# Patient Record
(demographics unavailable — no encounter records)

---

## 2024-10-17 NOTE — PHYSICAL EXAM
[2+] : left 2+ [Ankle Swelling (On Exam)] : present [] : present [Alert] : alert [Oriented to Person] : oriented to person [Oriented to Place] : oriented to place [Oriented to Time] : oriented to time [de-identified] : alert and oriented in NAD [de-identified] : b/l dermatitis  [de-identified] : CN II-XII grossly intact

## 2024-10-17 NOTE — HISTORY OF PRESENT ILLNESS
[FreeTextEntry1] : 88yo M with pmHx LBBB s/p PPM, HLD, prostate ca s/p radiation therapy, vertigo who presents for consult after hospitalization for near fainting episode.  Pt reports long standing hx of dizziness for which he takes meclizine daily for.  Recently he reports worsening dizzy spells to the point he almost fainted.  PEARSON in the hospital showed moderate irregular plaque and R ICA stenosis.  Pt denies amaurosis, syncope or stroke symptoms.  He also c/o rash to both lower  extremities with superficial excoriations 2/2 to scratching.

## 2024-10-17 NOTE — PLAN
[facility] - Short Stay Cardiac Unit  Discharge Note  Short Stay    Procedure(s) (LRB):  CATHETERIZATION, HEART, COMBINED RIGHT AND RETROGRADE LEFT, FOR CONGENITAL HEART DEFECT (N/A)  ECHOCARDIOGRAM, TRANSTHORACIC    OUTCOME: Patient tolerated treatment/procedure well without complication and is now ready for discharge.    DISPOSITION: Home or Self Care    FINAL DIAGNOSIS:  Repaired coarctation, MVR    FOLLOWUP: In clinic    DISCHARGE INSTRUCTIONS:  Standard precautions, resume prior medications    TIME SPENT ON DISCHARGE: 20 minutes  
[TextEntry] : 88yo M with c/o dizziness, CTA head and neck in hospital shows stenosis right ICA with moderate plaque imaging today shows normal velocities, resistive flow right vertebral - will refer patent to Dr. Butler for further management

## 2024-11-14 NOTE — HISTORY OF PRESENT ILLNESS
[FreeTextEntry1] : 87-year-old male with complex past medical history as listed bilateral carotid artery stenosis.  Patient was found to have right carotid artery high-grade stenosis and CT angiogram for dizzy spells in outside hospital.  Denies any amaurosis fugax TIA symptoms.  Past medical history significant for aortic valvular disease.  Patient also has bilateral lower extremity varicosities symptomatic.

## 2024-11-14 NOTE — REVIEW OF SYSTEMS
[Heart Rate Is Slow] : the heart rate was not slow [Heart Rate Is Fast] : the heart rate was not fast [Chest Pain] : no chest pain [Palpitations] : no palpitations [Leg Claudication] : no intermittent leg claudication [Lower Ext Edema] : lower extremity edema [Limb Pain] : limb pain [Limb Swelling] : limb swelling [Negative] : Psychiatric

## 2024-11-14 NOTE — ASSESSMENT
[FreeTextEntry1] : Carotid artery stenosis CT angiogram was reviewed.  Right carotid artery high-grade stenosis was noted. Patient to continue medical management with 81 mg of aspirin daily Patient to follow-up in 3 months for reevaluation Given patient asymptomatic status and patient age we will continue medical management at this point  Bilateral lower extremity symptomatic varicosities Wound care Phlebectomy Referral to Dr. Galicia

## 2024-11-14 NOTE — PHYSICAL EXAM
[de-identified] : Head and neck exam within normal limit [de-identified] : Breathing comfortably on room air [de-identified] : Regular rate and rhythm [FreeTextEntry1] : Palpable bilateral radial and femoral pulses.  Bilateral lower extremity varicosities.  Evidence of healed left lateral venous wound.

## 2024-12-03 NOTE — PLAN
[TextEntry] : 86yo male with b/l LE CVI, he was found to have saphenous reflux.  He is asymptomatic and presented today with concerns of area left ankle.  This appears to be hyperkeratotic skin with excoriated overlying skin 2/2 scratching.  - He will follow-up with dermatology asap.   - will manage his CVI conservatively with compression stockings.

## 2024-12-03 NOTE — PHYSICAL EXAM
[Respiratory Effort] : normal respiratory effort [2+] : left 2+ [Ankle Swelling (On Exam)] : present [Ankle Swelling Bilaterally] : bilaterally  [Varicose Veins Of Lower Extremities] : bilaterally [] : bilaterally [de-identified] : alert and oriented in NAD [FreeTextEntry1] :   Hyperkeratotic skin left lateral ankle, overlying skin excoriated likely 2/2 scratching.

## 2024-12-03 NOTE — HISTORY OF PRESENT ILLNESS
Nurse to nurse report given to [facility] [FreeTextEntry1] : 86yo M with pmHx LBBB s/p PPM, HLD, prostate ca s/p radiation therapy, vertigo who presents for follow-up for bilateral CVI.  He is concerned with a new "lump" left ankle x 1 month.   Patient was referred to Dr. Marinelli for evaluation of carotid artery stenosis, after imaging reviewed, he will monitor with yearly ultrasounds and he did not believe this was contributing to his dizziness. after hospitalization for near fainting episode. He denies lower extremity pain or heaviness.  He does have pruritus to both legs that is intermittent.

## 2025-01-21 NOTE — ASSESSMENT
[FreeTextEntry1] : Hemodynamically stable with acceptable BP Lab profiles drawn in office and sent B12 1ml IM given L deltoid

## 2025-01-21 NOTE — HISTORY OF PRESENT ILLNESS
[de-identified] : Presents with daughter for BP check, labs, and general follow-up; also due for B12 injection; also needs forms completed and TB screening for (?) temporary residence in Assisted Living.  States feeling generally well.

## 2025-02-05 NOTE — PROCEDURE
[Pacemaker] : pacemaker [DDD] : DDD [Voltage: ___ volts] : Voltage was [unfilled] volts [Longevity: ___ months] : The estimated remaining battery life is [unfilled] months [Threshold Testing Performed] : Threshold testing was performed [Atrial] : Atrial [Ventricular] : Ventricular [Lead Imp:  ___ohms] : lead impedance was [unfilled] ohms [Sensing Amplitude ___mv] : sensing amplitude was [unfilled] mv [___V @] : [unfilled] V [___ ms] : [unfilled] ms [None] : none [Asense-Vsense ___ %] : Asense-Vsense [unfilled]% [Asense-Vpace ___ %] : Asense-Vpace [unfilled]% [Apace-Vsense ___ %] : Apace-Vsense [unfilled]% [Apace-Vpace ___ %] : Apace-Vpace [unfilled]% [de-identified] : MEDTRONIC [de-identified] : [id number] [de-identified] : [id number] [de-identified] : 4/28/2017 [de-identified] :  [de-identified] : no episodes normal device function

## 2025-02-27 NOTE — PHYSICAL EXAM
[de-identified] : Head and neck exam within normal limit [de-identified] : Breathing comfortably on room air [de-identified] : Regular rate and rhythm [FreeTextEntry1] : Palpable bilateral radial and femoral pulses.  Bilateral lower extremity varicosities.  Evidence of healed left lateral venous wound.

## 2025-02-27 NOTE — HISTORY OF PRESENT ILLNESS
[FreeTextEntry1] : 87-year-old male with complex past medical history as listed bilateral carotid artery stenosis.  Patient was found to have right carotid artery high-grade stenosis and CT angiogram for dizzy spells in outside hospital.  Denies any amaurosis fugax TIA symptoms.  Past medical history significant for aortic valvular disease.  Patient also has bilateral lower extremity varicosities symptomatic.  2/27/2025: Patient presented for follow-up.  Carotid artery stenosis no symptoms.  He underwent left lower extremity skin lesion resection recently which was positive for squamous cell cancer.

## 2025-02-27 NOTE — DATA REVIEWED
[FreeTextEntry1] : 2/27/2025 arterial carotid duplex examination showed less than 50% stenosis bilateral internal carotid artery bilaterally

## 2025-02-27 NOTE — ASSESSMENT
[FreeTextEntry1] : Carotid artery stenosis CT angiogram was reviewed.  Right carotid artery high-grade stenosis was noted however carotid artery duplex revealed less than 50% stenosis Patient to continue medical management with 81 mg of aspirin daily Patient to follow-up in 12 months for reevaluation Given patient asymptomatic status and patient age we will continue medical management at this point

## 2025-02-28 NOTE — ASSESSMENT
[FreeTextEntry1] : Hemodynamically stable with acceptable BP St 2 LLE - re-dressed; will attempt to have Wound Care see patient Try low dose Lexapro and observe

## 2025-02-28 NOTE — HISTORY OF PRESENT ILLNESS
[de-identified] : Presents with daughter for general follow-up.  Also needs PPD for facility; also daughter is concerned about a wound on his L lower leg.  Also daughter states patient has displayed marked increase in anxiety and is requesting "something."

## 2025-02-28 NOTE — PHYSICAL EXAM
[No Acute Distress] : no acute distress [Normal] : normal rate, regular rhythm, normal S1 and S2 and no murmur heard [Soft] : abdomen soft [Non Tender] : non-tender [Normal Posterior Cervical Nodes] : no posterior cervical lymphadenopathy [Normal Anterior Cervical Nodes] : no anterior cervical lymphadenopathy [No Focal Deficits] : no focal deficits [Alert and Oriented x3] : oriented to person, place, and time [de-identified] : chronic LE changes [de-identified] : Stage 2 lateral aspect L lower leg - approx [id number]; no drainage; no erythema

## 2025-03-04 NOTE — PHYSICAL EXAM
[2+] : left 2+ [Ankle Swelling (On Exam)] : present [Ankle Swelling Bilaterally] : bilaterally  [Varicose Veins Of Lower Extremities] : bilaterally [] : bilaterally [Skin Ulcer] : ulcer [de-identified] : alert and oriented in NAD [de-identified] : L emmanuelf s/p excision of SCC

## 2025-03-04 NOTE — PLAN
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  He is awaiting wound care to be set up at [facility].  Continue with compression stockings daily.  Home health services for wound care daily.  Would recommend treatment of his underlying venous insufficiency.  Will plan for VenaSeal of the GSV bilaterally.  Continue follow-up with Dr. Marinelli today regarding carotid artery stenosis.

## 2025-03-04 NOTE — HISTORY OF PRESENT ILLNESS
[FreeTextEntry1] : This is an 87-year-old male past medical history of carotid artery stenosis right carotid high-grade stenosis is noted on CTA later duplex reported that is less than 50% he was evaluated by Dr. Barron today who recommended medical management with aspirin daily and to return to clinic in 12 months.  He recently had a resection of the left lower leg lesion which turned out to be squamous cell carcinoma he now has a large open ulceration on his lateral calf.  Originally when he was evaluated, he underwent a venous study of both legs which showed bilateral saphenous reflux.  He is now wearing knee-high compression stockings daily.

## 2025-03-06 NOTE — HISTORY OF PRESENT ILLNESS
[de-identified] : Pt is an 86 y/o male with right patella fracture.  He tripped and fell 2 days ago and he landed on his right knee.  He had pain immediately.  He went to Urgent Care where xrays revealed a right patella fracture.  He was advised to follow up with a specialist.  He is ambulalting with a walker.

## 2025-03-06 NOTE — PHYSICAL EXAM
[de-identified] : Patient is WDWN, alert, and in no acute distress. Breathing is unlabored. He is grossly oriented to person, place, and time.  He is accompanied here with his daughter.  Right knee: There is no patellar tenderness to palpation. Positive swelling and abrasion Range of motion: Limited with pain. 0-90 Tests and Signs: All tests for stability are normal. Strength: Limited Abrasion on anterior aspect of knee.  [de-identified] : Right knee xrays 4v reveal a right nondisplaced patella fracture  Procedure was performed at the [facility] EXAM: KNEE RIGHT PROCEDURE DATE: 03/04/2025 INTERPRETATION: CLINICAL INDICATION: Knee pain after fall. Question fracture. TECHNIQUE: PA, oblique, and lateral views of the right knee. COMPARISON: None available. FINDINGS: Linear lucency suspicious for vertically oriented fracture of the lateral one third of the patella. No additional suspected fracture. No dislocation. Cartilage space narrowing and marginal osteophyte formation in the medial tibiofemoral patellofemoral compartments. Small knee joint effusion. Vascular calcifications. IMPRESSION: Linear lucency suspicious for nondisplaced fracture of the patella. Correlate with site of point tenderness. Consider sunrise views of the knee or cross-sectional imaging for further evaluation. --- End of Report ---             MARIAN STACY MD; Attending Radiologist This document has been electronically signed. Mar 4 2025 11:48AM

## 2025-03-06 NOTE — DISCUSSION/SUMMARY
[de-identified] : The underlying pathophysiology was reviewed with the patient. XR films were reviewed with the patient. Discussed at length the nature of the patient's condition. The right knee symptoms appear secondary to a nondisplaced fracture of the patella.. The patient and I discussed his options regarding treatment.  Patient was advised to take OTC medications and topical analgesic for pain management. Activity modification was discussed in great detail. Weight bear as tolerated with his walker. I encouraged the patient to actively walk. The patient can elevate his right knee above the level of the heart for swelling control. Gentle range of motion, stretching, and strengthening exercises were encouraged. Patient should actively work on gradually increasing use of the leg, as tolerated.  All questions answered, understanding verbalized. Patient in agreement with plan of care. The patient can follow-up in 1 month. If the patient begins to experience any changes or severe exacerbation of his symptoms, he should reach out to me as soon as possible.

## 2025-03-06 NOTE — HISTORY OF PRESENT ILLNESS
[de-identified] : Pt is an 86 y/o male with right patella fracture.  He tripped and fell 2 days ago and he landed on his right knee.  He had pain immediately.  He went to Urgent Care where xrays revealed a right patella fracture.  He was advised to follow up with a specialist.  He is ambulalting with a walker.

## 2025-03-06 NOTE — PHYSICAL EXAM
[de-identified] : Patient is WDWN, alert, and in no acute distress. Breathing is unlabored. He is grossly oriented to person, place, and time.  He is accompanied here with his daughter.  Right knee: There is no patellar tenderness to palpation. Positive swelling and abrasion Range of motion: Limited with pain. 0-90 Tests and Signs: All tests for stability are normal. Strength: Limited Abrasion on anterior aspect of knee.  [de-identified] : Right knee xrays 4v reveal a right nondisplaced patella fracture  Procedure was performed at the [facility] EXAM: KNEE RIGHT PROCEDURE DATE: 03/04/2025 INTERPRETATION: CLINICAL INDICATION: Knee pain after fall. Question fracture. TECHNIQUE: PA, oblique, and lateral views of the right knee. COMPARISON: None available. FINDINGS: Linear lucency suspicious for vertically oriented fracture of the lateral one third of the patella. No additional suspected fracture. No dislocation. Cartilage space narrowing and marginal osteophyte formation in the medial tibiofemoral patellofemoral compartments. Small knee joint effusion. Vascular calcifications. IMPRESSION: Linear lucency suspicious for nondisplaced fracture of the patella. Correlate with site of point tenderness. Consider sunrise views of the knee or cross-sectional imaging for further evaluation. --- End of Report ---             MARIAN STACY MD; Attending Radiologist This document has been electronically signed. Mar 4 2025 11:48AM

## 2025-03-06 NOTE — DISCUSSION/SUMMARY
[de-identified] : The underlying pathophysiology was reviewed with the patient. XR films were reviewed with the patient. Discussed at length the nature of the patient's condition. The right knee symptoms appear secondary to a nondisplaced fracture of the patella.. The patient and I discussed his options regarding treatment.  Patient was advised to take OTC medications and topical analgesic for pain management. Activity modification was discussed in great detail. Weight bear as tolerated with his walker. I encouraged the patient to actively walk. The patient can elevate his right knee above the level of the heart for swelling control. Gentle range of motion, stretching, and strengthening exercises were encouraged. Patient should actively work on gradually increasing use of the leg, as tolerated.  All questions answered, understanding verbalized. Patient in agreement with plan of care. The patient can follow-up in 1 month. If the patient begins to experience any changes or severe exacerbation of his symptoms, he should reach out to me as soon as possible.

## 2025-03-06 NOTE — ADDENDUM
[FreeTextEntry1] : I, Fabian Vasquez wrote this note acting as a scribe for Dr. Augustine Cruz on 03/06/2025.   All medical record entries made by the Scribe were at my, Dr. Augustine Cruz M.D., direction and personally dictated by me on 03/06/2025. I have personally reviewed the chart and agree that the record accurately reflects my personal performance of the history, physical exam, assessment and plan

## 2025-03-07 NOTE — DISCUSSION/SUMMARY
[Patient] : the patient [Risks] : risks [Benefits] : benefits [Alternatives] : alternatives [___ Month(s)] : in [unfilled] month(s) [FreeTextEntry1] : Requested stat venous duplex.  Discussed with patient and daughter.  Continue crestor, ramipril, aspirin  beta-blocker.  Periodic pacemaker interrogation.  Follow-up 6 months  Continue vascular follow-up and medical follow-up as well   [EKG obtained to assist in diagnosis and management of assessed problem(s)] : EKG obtained to assist in diagnosis and management of assessed problem(s)

## 2025-03-07 NOTE — ASSESSMENT
[FreeTextEntry1] : 87 year old man, seen prior to eye surgery.  s/p hospital stay for vertigo with incidental finding of carotid plaque, being followed by vascular surgery.  Venous insufficiency rule out acute DVT  BP controlled  ASHD.  Status post pacer with an estimated 2-1/2 years of

## 2025-03-07 NOTE — PHYSICAL EXAM
[Well Nourished] : well nourished [No Acute Distress] : no acute distress [Soft] : abdomen soft [Venous varicosities] : venous varicosities [Normal] : alert and oriented, normal memory [de-identified] : Tender left calf with 1+ pitting edema

## 2025-03-07 NOTE — HISTORY OF PRESENT ILLNESS
[FreeTextEntry1] : 87 year old man seen for follow-up visit accompanied by daughter.  [facility] into [facility] with his wife  Reviewed last interrogation, normal function Medtronic Pacer, 2-1/2 years of battery  And squamous cell cancer removed from leg.  Seeing Dr. Kayla Farrell vascular for venous insufficiency plan for further procedure.  Has had increased swelling unilateral left leg.  No chest pain dyspnea

## 2025-03-07 NOTE — REASON FOR VISIT
[Arrhythmia/ECG Abnorrmalities] : arrhythmia/ECG abnormalities [Coronary Artery Disease] : coronary artery disease [Other: ____] : [unfilled] [Other: _____] : [unfilled]

## 2025-03-07 NOTE — CARDIOLOGY SUMMARY
[de-identified] : December 1, 2022 pacing with capture March 2, 2023 sinus rhythm with ventricular pacing atrial tracking 8.21.23 sinus v pacing February 26, 2024 sinus ventricular pacing 8/29/24 dual chamber pacing September 27, 2024 pacing with capture [de-identified] : October 2021 normal nuclear stress [de-identified] : October 2021 mild to moderate LV dysfunction septal motion compatible with RV pacing September 2024 LV size increased wall thickness EF 45 to 50% [de-identified] : 2017 pacemaker [de-identified] : 2008 in 2017, 40% stenosis

## 2025-04-02 NOTE — ASSESSMENT
[FreeTextEntry1] : 4-2-25: Pt here in consultation forSANDI wound Accompanied by spouse and daughter On exam: LLE:  dry skin:  wound with slough and hypergran. superficial wounds in periowund with dry skin. s/p excisional debridement and monsel's to hypergran.  No s/s of infection.

## 2025-04-02 NOTE — PHYSICAL EXAM
[Skin Ulcer] : ulcer [Alert] : alert [Oriented to Person] : oriented to person [Oriented to Place] : oriented to place [Oriented to Time] : oriented to time [Calm] : calm [Please See PDF for Tissue Analytics] : Please See PDF for Tissue Analytics. [1+] : left 1+ [Ankle Swelling (On Exam)] : present [Ankle Swelling Bilaterally] : bilaterally  [Ankle Swelling On The Right] : mild [Varicose Veins Of Lower Extremities] : bilaterally [de-identified] : NAD [de-identified] : AT [de-identified] : supple [de-identified] : equal chest  rise [de-identified] : FROM [de-identified] : soft NT

## 2025-04-02 NOTE — PLAN
[FreeTextEntry1] : 4-2-25: Plan: LLE wound wash with soap and water honey/adaptic/aquacel/gauze/kelsey/ACE 3x/week Moisturize intact skin. Do not put between toes.  Nursing orders given f/u with Dr. Sasha ovalles as per PMD f/u in 2-3 week

## 2025-04-02 NOTE — HISTORY OF PRESENT ILLNESS
[FreeTextEntry1] : 4-2-25: Mr. LAILA MANCIA   presents to the office with a wound for since 1-7-25 (Mohs) duration.  The wound is located on  the LLE  The patient has complaints of non healing  The patient has been dressing the wound with wet to dry. The patient denies fevers or chills.  The patient has localized pain to the wound upon dressing changes.  The patient has no other complaints or associated symptoms.   Pt recently admitted to [facility] for LLE wound and cellulitis-- 3-7-25 to 3-11-25.  Mohs procedure on 1-7-25 by [facility] Pt has a home care Pt lives in assisted living Pt with BUE rash.  Saw PMD today and given steroids.    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 He is awaiting wound care to be set up at [facility]. Continue with compression stockings daily. Home health services for wound care daily. Would recommend treatment of his underlying venous insufficiency. Will plan for VenaSeal of the GSV bilaterally. Continue follow-up with Dr. Marinelli today regarding carotid artery stenosis."

## 2025-04-02 NOTE — REASON FOR VISIT
[Consultation] : a consultation visit [Family Member] : family member [Spouse] : spouse [FreeTextEntry1] : LLE wound s/p Mohs.  Recently admitted for cellulitis

## 2025-04-03 NOTE — PHYSICAL EXAM
[Normal] : no jugular venous distention, supple, no lymphadenopathy and the thyroid was normal and there were no nodules present [de-identified] : WOund checked - noted half dollar granulated healing wound noted at  left lateral aspect of distal left extremity.   Generalizes erythematous warm rashes noted on both forearms

## 2025-04-03 NOTE — ASSESSMENT
[FreeTextEntry1] : Wound cleansed with NS, triple abx ointment applied/ telfa dressing applied with kerlex dressing. Is following up with wound management

## 2025-04-03 NOTE — HISTORY OF PRESENT ILLNESS
[FreeTextEntry8] : Patient presents with urticarious acute rash that was noted of bilateral forearms noted yesterday. History of chronic itch and likely lower extremity stasis dermatitis. Of note patient recently had squamous cell carcinoma resected from lateral left lower distal extremity. Currently has surgical wound that is being cared by wound management. Was previously hospitalized with cellulitis.   HAs been living at [facility] assisted living for about one month with wife. was getting similar rash on thighs that went away with wearing pants at sleep. family asked [facility] to use different detergent.   Accompanied by daughter at visit who is concerned about patient anxiety which has been described as moderate and chronic. Was on Lexapro. With recent hospitaliztion was noted to be acutely hyponatremic which medical team suggested could be related to ssri. Family asking about different options inclluding gabapentin, or mirtazipine

## 2025-04-03 NOTE — PHYSICAL EXAM
[Normal] : no jugular venous distention, supple, no lymphadenopathy and the thyroid was normal and there were no nodules present [de-identified] : WOund checked - noted half dollar granulated healing wound noted at  left lateral aspect of distal left extremity.   Generalizes erythematous warm rashes noted on both forearms

## 2025-04-03 NOTE — HISTORY OF PRESENT ILLNESS
[FreeTextEntry8] : Patient presents with urticarious acute rash that was noted of bilateral forearms noted yesterday. History of chronic itch and likely lower extremity stasis dermatitis. Of note patient recently had squamous cell carcinoma resected from lateral left lower distal extremity. Currently has surgical wound that is being cared by wound management. Was previously hospitalized with cellulitis.   HAs been living at [facility] assisted living for about one month with wife. was getting similar rash on thighs that went away with wearing pants at sleep. family asked [facility] to use different detergent.   Accompanied by daughter at visit who is concerned about patient anxiety which has been described as moderate and chronic. Was on Lexapro. With recent hospitaliztion was noted to be acutely hyponatremic which medical team suggested could be related to ssri. Family asking about different options inclluding gabapentin, or mirtazipine

## 2025-04-18 NOTE — PHYSICAL EXAM
[1+] : left 1+ [Ankle Swelling (On Exam)] : present [Ankle Swelling Bilaterally] : bilaterally  [Ankle Swelling On The Right] : mild [Varicose Veins Of Lower Extremities] : bilaterally [Skin Ulcer] : ulcer [Alert] : alert [Oriented to Person] : oriented to person [Oriented to Place] : oriented to place [Oriented to Time] : oriented to time [Calm] : calm [Please See PDF for Tissue Analytics] : Please See PDF for Tissue Analytics. [de-identified] : NAD [de-identified] : AT [de-identified] : supple [de-identified] : equal chest  rise [de-identified] : soft NT [de-identified] : FROM

## 2025-04-18 NOTE — ASSESSMENT
[FreeTextEntry1] : 4-2-25: Pt here in consultation forLLE wound Accompanied by spouse and daughter On exam: LLE:  dry skin:  wound with slough and hypergran. superficial wounds in periowund with dry skin. s/p excisional debridement and monsel's to hypergran.  No s/s of infection.   4-18-25: Pt here for f/u Accompanied by daughter No new complaints Still has to make appt with Dr. Baez Nurse comes 3x/week Pt scratching leg On exam: LLE wound: smaller, slough s/p excisional debridement,  petechiae in periwound withdry flaking skin No s/s of infection.

## 2025-04-18 NOTE — REASON FOR VISIT
[Follow-Up: _____] : a [unfilled] follow-up visit [Spouse] : spouse [Family Member] : family member [FreeTextEntry1] : LLE wound s/p Mohs.  Recently admitted for cellulitis

## 2025-04-18 NOTE — PLAN
[FreeTextEntry1] : 4-2-25: Plan: LLE wound wash with soap and water honey/adaptic/aquacel/gauze/kelsey/ACE 3x/week Moisturize intact skin. Do not put between toes. Do not scratch Nursing orders given f/u with Dr. Baez rash as per PMD f/u in 2-3 week   4-18-25: Plan: wash with soap and water honey/adaptic/aquacel/gauze/kelsey/ACE 3x/week Moisturize intact skin. Do not put between toes.  Nursing orders given f/u with Dr. Baez rash as per PMD f/u in 2-3 week   4-18-25: Plan: LLE wash with soap and water honey/adaptic/aquacel/gauze/kelsey/ACE 3x/week Moisturize intact skin. Do not put between toes. (Cerave or Eucerin) Nursing orders given f/u with Dr. Baez rash as per PMD f/u in 2-3 week

## 2025-04-18 NOTE — HISTORY OF PRESENT ILLNESS
[FreeTextEntry1] : 4-2-25: Mr. LAILA MANCIA   presents to the office with a wound for since 1-7-25 (Mohs) duration.  The wound is located on  the LLE  The patient has complaints of non healing  The patient has been dressing the wound with wet to dry. The patient denies fevers or chills.  The patient has localized pain to the wound upon dressing changes.  The patient has no other complaints or associated symptoms.   Pt recently admitted to [place] for LLE wound and cellulitis-- 3-7-25 to 3-11-25.  Mohs procedure on 1-7-25 by [facility] Pt has a home care Pt lives in assisted living Pt with BUE rash.  Saw PMD today and given steroids.    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 He is awaiting wound care to be set up at [facility]. Continue with compression stockings daily. Home health services for wound care daily. Would recommend treatment of his underlying venous insufficiency. Will plan for VenaSeal of the GSV bilaterally. Continue follow-up with Dr. Marinelli today regarding carotid artery stenosis."

## 2025-05-05 NOTE — PHYSICAL EXAM
[No Acute Distress] : no acute distress [Normal] : normal rate, regular rhythm, normal S1 and S2 and no murmur heard [No Edema] : there was no peripheral edema [Soft] : abdomen soft [Non Tender] : non-tender [Normal Posterior Cervical Nodes] : no posterior cervical lymphadenopathy [Normal Anterior Cervical Nodes] : no anterior cervical lymphadenopathy [No Focal Deficits] : no focal deficits [Alert and Oriented x3] : oriented to person, place, and time [de-identified] : no active bleeding

## 2025-05-05 NOTE — HISTORY OF PRESENT ILLNESS
[de-identified] : Presents with wife for BP check, labs, and general follow-up.  Note was just in the ER for a nosebleed - ENT F/U pending (Dr. Anand).

## 2025-05-09 NOTE — PLAN
[FreeTextEntry1] : 4-2-25: Plan: LLE wound wash with soap and water honey/adaptic/aquacel/gauze/kelsey/ACE 3x/week Moisturize intact skin. Do not put between toes. Do not scratch Nursing orders given f/u with Dr. Baez rash as per PMD f/u in 2-3 week   4-18-25: Plan: wash with soap and water honey/adaptic/aquacel/gauze/kelsey/ACE 3x/week Moisturize intact skin. Do not put between toes.  Nursing orders given f/u with Dr. Baez rash as per PMD f/u in 2-3 week   4-18-25: Plan: LLE wash with soap and water honey/adaptic/aquacel/gauze/kelsey/ACE 3x/week Moisturize intact skin. Do not put between toes. (Cerave or Eucerin) Nursing orders given f/u with Dr. Baez rash as per PMD f/u in 2-3 week   5-9-25: Plan: LLE wash with soap and water honey/adaptic/aquacel/gauze/kelsey/ACE 3x/week Moisturize intact skin. Do not put between toes. (Cerave or Eucerin) Nursing orders given f/u with Dr. Baez f/u in 2-3 week

## 2025-05-09 NOTE — PHYSICAL EXAM
[1+] : left 1+ [Ankle Swelling (On Exam)] : present [Ankle Swelling Bilaterally] : bilaterally  [Ankle Swelling On The Right] : mild [Varicose Veins Of Lower Extremities] : bilaterally [Skin Ulcer] : ulcer [Alert] : alert [Oriented to Person] : oriented to person [Oriented to Place] : oriented to place [Oriented to Time] : oriented to time [Calm] : calm [Please See PDF for Tissue Analytics] : Please See PDF for Tissue Analytics. [de-identified] : NAD [de-identified] : AT [de-identified] : supple [de-identified] : equal chest  rise [de-identified] : soft NT [de-identified] : FROM

## 2025-05-09 NOTE — ASSESSMENT
[FreeTextEntry1] : 4-2-25: Pt here in consultation forLLE wound Accompanied by spouse and daughter On exam: LLE:  dry skin:  wound with slough and hypergran. superficial wounds in periowund with dry skin. s/p excisional debridement and monsel's to hypergran.  No s/s of infection.   4-18-25: Pt here for f/u Accompanied by daughter No new complaints Still has to make appt with Dr. Baez Nurse comes 3x/week Pt scratching leg On exam: LLE wound: smaller, slough s/p excisional debridement,  petechiae in periwound withdry flaking skin No s/s of infection.   5-9-25: Pt here for f/u Accompanied by daughter No new complaints Has appt with Dr. Baez On exam: LLE wound:  smaller.  scant  slough . s/p excisional debridement   s/p mechanical debridement No s/s of infection.  dry scaly skin to legs

## 2025-05-09 NOTE — HISTORY OF PRESENT ILLNESS
[FreeTextEntry1] : 4-2-25: Mr. LAILA MANCIA   presents to the office with a wound for since 1-7-25 (Mohs) duration.  The wound is located on  the LLE  The patient has complaints of non healing  The patient has been dressing the wound with wet to dry. The patient denies fevers or chills.  The patient has localized pain to the wound upon dressing changes.  The patient has no other complaints or associated symptoms.   Pt recently admitted to [place] for LLE wound and cellulitis-- 3-7-25 to 3-11-25.  Mohs procedure on 1-7-25 by [facility] Pt has a home care Pt lives in assisted living Pt with BUE rash.  Saw PMD today and given steroids.    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 He is awaiting wound care to be set up at [facility]. Continue with compression stockings daily. Home health services for wound care daily. Would recommend treatment of his underlying venous insufficiency. Will plan for VenaSeal of the GSV bilaterally. Continue follow-up with Dr. Marinelli today regarding carotid artery stenosis."

## 2025-05-28 NOTE — PROCEDURE
[FreeTextEntry1] : L GSV Venaseal [FreeTextEntry2] : CVI with non-healing ulceration [FreeTextEntry3] : Procedure: Duplex ultrasound was used to map out the left insufficient great saphenous vein, and access was determined and marked on the overlying skin. The depth and diameter of the vein(s) to be treated was documented. The patient was placed supine on the procedure table and the leg was prepped and draped using sterile technique.  Ultrasound guidance was again used to localize the access site. 1% lidocaine was injected as a local anesthetic in the subcutaneous tissues at the target location in the GSV in the lower leg. Using ultrasound guidance, access was gained at this location with the 19 gauge thin walled access needle and followed by introduction of a short guidewire, location confirmed with ultrasound. A small, 3 mm incision was made at the access site to allow for introduction and placement of the 7 Fr x7cm introducer/dilator. The dilator and guidewire were removed. The 0.035 guidewire from the Venaseal kit was then introduced and positioned at the saphenofemoral junction using ultrasound guidance. The 80 cm 7 Fr introducer sheath/dilator was positioned 5cm from the saphenofemoral junction. The guidewire and dilator were removed, and the remaining sheath was flushed with sterile saline, with the syringe remaining in place prior to the next steps.  The cyanoacrylate adhesive was precisely primed into the 5 F delivery catheter and this catheter/syringe combination was attached within the dispenser gun. This assembly was introduced through the 7 F sheath and positioned 5 cm caudal of the saphenofemoral junction under ultrasound guidance. The steps from the IFU were followed for dispensing amounts, locations and compression times, e.g 2 aliquots proximally with 3 minutes of compression, and 1 aliquot every 3cm distally with 30 sec of compression along the course of the vessel (insert specific language per physician preference). Following the last injection and compression sequence, the catheter and introducer sheath were pulled out from the access site. Hemostasis was achieved with manual compression and an adhesive bandage was applied to the incision. Ultrasound confirmed complete coaptation and closure of the treated segments of the GSV, and the absence of any DVT at the saphenofemoral junction.  Treatment length was 20 cm.  The drapes were removed and the patient cleaned and prepared for discharge. Post op ultrasound check is scheduled for 48- 72 hours and the patient was given written post-op instructions.  c/w wound care recommendations form wound facility.  Leg cleansed and wound dressed with non-stick today. Hold off on RLE procedure for now.

## 2025-05-30 NOTE — ASSESSMENT
[FreeTextEntry1] : 4-2-25: Pt here in consultation forLLE wound Accompanied by spouse and daughter On exam: LLE:  dry skin:  wound with slough and hypergran. superficial wounds in periowund with dry skin. s/p excisional debridement and monsel's to hypergran.  No s/s of infection.   4-18-25: Pt here for f/u Accompanied by daughter No new complaints Still has to make appt with Dr. Baez Nurse comes 3x/week Pt scratching leg On exam: LLE wound: smaller, slough s/p excisional debridement,  petechiae in periwound withdry flaking skin No s/s of infection.   5-9-25: Pt here for f/u Accompanied by daughter No new complaints Has appt with Dr. Baez On exam: LLE wound:  smaller.  scant  slough . s/p excisional debridement   s/p mechanical debridement No s/s of infection.  dry scaly skin to legs  5-30-25 Pt here for f/u Accompanied by daughter Had ablation on the LLE. Planning on having ablation on the RLE June 18th.  On exam: LLE wound:  smaller., superficial   scant  slough. s/p mechanical debridement No s/s of infection.  dry scaly skin with punctate areas of erythema to legs

## 2025-05-30 NOTE — PHYSICAL EXAM
[1+] : left 1+ [Ankle Swelling (On Exam)] : present [Ankle Swelling Bilaterally] : bilaterally  [Ankle Swelling On The Right] : mild [Varicose Veins Of Lower Extremities] : bilaterally [Skin Ulcer] : ulcer [Alert] : alert [Oriented to Person] : oriented to person [Oriented to Place] : oriented to place [Oriented to Time] : oriented to time [Calm] : calm [Please See PDF for Tissue Analytics] : Please See PDF for Tissue Analytics. [de-identified] : NAD [de-identified] : AT [de-identified] : supple [de-identified] : equal chest  rise [de-identified] : FROM

## 2025-05-30 NOTE — HISTORY OF PRESENT ILLNESS
[FreeTextEntry1] : 4-2-25: Mr. LAILA MANCIA   presents to the office with a wound for since 1-7-25 (Mohs) duration.  The wound is located on  the LLE  The patient has complaints of non healing  The patient has been dressing the wound with wet to dry. The patient denies fevers or chills.  The patient has localized pain to the wound upon dressing changes.  The patient has no other complaints or associated symptoms.   Pt recently admitted to [place] for LLE wound and cellulitis-- 3-7-25 to 3-11-25.  Mohs procedure on 1-7-25 by [facility] Pt has a home care Pt lives in assisted living Pt with BUE rash.  Saw PMD today and given steroids.    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 He is awaiting wound care to be set up at [facility]. Continue with compression stockings daily. Home health services for wound care daily. Would recommend treatment of his underlying venous insufficiency. Will plan for VenaSeal of the GSV bilaterally. Continue follow-up with Dr. Marinelli today regarding carotid artery stenosis."  5-30-25 Pt presents for follow up with his daughter. He was trying to put his regular shoe on and pulled his ACE wrap above his ankle causing his foot to swell. His home health nurse comes twice a week.

## 2025-05-30 NOTE — PLAN
[FreeTextEntry1] : 4-2-25: Plan: LLE wound wash with soap and water honey/adaptic/aquacel/gauze/kelsey/ACE 3x/week Moisturize intact skin. Do not put between toes. Do not scratch Nursing orders given f/u with Dr. Baez rash as per PMD f/u in 2-3 week   4-18-25: Plan: wash with soap and water honey/adaptic/aquacel/gauze/kelsey/ACE 3x/week Moisturize intact skin. Do not put between toes.  Nursing orders given f/u with Dr. Baez rash as per PMD f/u in 2-3 week   4-18-25: Plan: LLE wash with soap and water honey/adaptic/aquacel/gauze/kelsey/ACE 3x/week Moisturize intact skin. Do not put between toes. (Cerave or Eucerin) Nursing orders given f/u with Dr. Baez rash as per PMD f/u in 2-3 week   5-9-25: Plan: LLE wash with soap and water honey/adaptic/aquacel/gauze/kelsey/ACE 3x/week Moisturize intact skin. Do not put between toes. (Cerave or Eucerin) Nursing orders given f/u with Dr. Baez f/u in 2-3 week   5-30-25 LLE wash with soap and water adaptic/gauze/kelsey/ACE 3x/week Moisturize intact skin. Do not put between toes. (Cerave or Eucerin) Nursing orders given F/u with dermatology for dry scaly skin punctate areas of erythema  f/u vascular post procedure and for future procedures f/u in 2-3 week

## 2025-06-13 NOTE — PHYSICAL EXAM
[1+] : left 1+ [Ankle Swelling (On Exam)] : present [Ankle Swelling Bilaterally] : bilaterally  [Ankle Swelling On The Right] : mild [Varicose Veins Of Lower Extremities] : bilaterally [Skin Ulcer] : ulcer [Alert] : alert [Oriented to Person] : oriented to person [Oriented to Place] : oriented to place [Oriented to Time] : oriented to time [Calm] : calm [Please See PDF for Tissue Analytics] : Please See PDF for Tissue Analytics. [de-identified] : NAD [de-identified] : AT [de-identified] : supple [de-identified] : equal chest  rise [de-identified] : FROM

## 2025-06-13 NOTE — HISTORY OF PRESENT ILLNESS
[FreeTextEntry1] : 4-2-25: Mr. LAILA MANCIA   presents to the office with a wound for since 1-7-25 (Mohs) duration.  The wound is located on  the LLE  The patient has complaints of non healing  The patient has been dressing the wound with wet to dry. The patient denies fevers or chills.  The patient has localized pain to the wound upon dressing changes.  The patient has no other complaints or associated symptoms.   Pt recently admitted to [place] for LLE wound and cellulitis-- 3-7-25 to 3-11-25.  Mohs procedure on 1-7-25 by [facility] Pt has a home care Pt lives in assisted living Pt with BUE rash.  Saw PMD today and given steroids.    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 He is awaiting wound care to be set up at [facility]. Continue with compression stockings daily. Home health services for wound care daily. Would recommend treatment of his underlying venous insufficiency. Will plan for VenaSeal of the GSV bilaterally. Continue follow-up with Dr. Marinelli today regarding carotid artery stenosis."  5-30-25 Pt presents for follow up with his daughter. He was trying to put his regular shoe on and pulled his ACE wrap above his ankle causing his foot to swell. His home health nurse comes twice a week.

## 2025-06-13 NOTE — ASSESSMENT
[FreeTextEntry1] : 4-2-25: Pt here in consultation forLLE wound Accompanied by spouse and daughter On exam: LLE:  dry skin:  wound with slough and hypergran. superficial wounds in periowund with dry skin. s/p excisional debridement and monsel's to hypergran.  No s/s of infection.   4-18-25: Pt here for f/u Accompanied by daughter No new complaints Still has to make appt with Dr. Baez Nurse comes 3x/week Pt scratching leg On exam: LLE wound: smaller, slough s/p excisional debridement,  petechiae in periwound withdry flaking skin No s/s of infection.   5-9-25: Pt here for f/u Accompanied by daughter No new complaints Has appt with Dr. Baez On exam: LLE wound:  smaller.  scant  slough . s/p excisional debridement   s/p mechanical debridement No s/s of infection.  dry scaly skin to legs  5-30-25 Pt here for f/u Accompanied by daughter Had ablation on the LLE. Planning on having ablation on the RLE June 18th.  On exam: LLE wound:  smaller., superficial   scant  slough. s/p mechanical debridement No s/s of infection.  dry scaly skin with punctate areas of erythema to legs  6-13-25: Pt here for f/u Accompanied by daughter No new complaints Has appt with vasc on 6-18-25 Hasnt seen derm yet On exam: LLE: dry scaly skin with scattered areas of punctate erythema.  small superficial wound remains s/p mechanical debridement  No s/s of infection.

## 2025-06-13 NOTE — PLAN
[FreeTextEntry1] : 4-2-25: Plan: LLE wound wash with soap and water honey/adaptic/aquacel/gauze/kelsey/ACE 3x/week Moisturize intact skin. Do not put between toes. Do not scratch Nursing orders given f/u with Dr. Sasha ovalles as per PMD f/u in 2-3 week   4-18-25: Plan: wash with soap and water honey/adaptic/aquacel/gauze/kelsey/ACE 3x/week Moisturize intact skin. Do not put between toes.  Nursing orders given f/u with Dr. Baez rash as per PMD f/u in 2-3 week   4-18-25: Plan: LLE wash with soap and water honey/adaptic/aquacel/gauze/kelsey/ACE 3x/week Moisturize intact skin. Do not put between toes. (Cerave or Eucerin) Nursing orders given f/u with Dr. Sasha ovalles as per PMD f/u in 2-3 week   5-9-25: Plan: LLE wash with soap and water honey/adaptic/aquacel/gauze/kelsey/ACE 3x/week Moisturize intact skin. Do not put between toes. (Cerave or Eucerin) Nursing orders given f/u with Dr. Baez f/u in 2-3 week   5-30-25 LLE wash with soap and water adaptic/gauze/kelsey/ACE 3x/week Moisturize intact skin. Do not put between toes. (Cerave or Eucerin) Nursing orders given F/u with dermatology for dry scaly skin punctate areas of erythema  f/u vascular post procedure and for future procedures f/u in 2-3 week   6-13-25: Plan: LLE wash with soap and water adaptic/gauze/kelsey/ACE 3x/week Moisturize intact skin. Do not put between toes. Will order ammonium lactate will order traimcinolone.  apply with dressing change x 1 week.  Then stop for one week.  May use again for 1 week after that.  Nursing orders given F/u with dermatology for dry scaly skin punctate areas of erythema  f/u vascular post procedure and for future procedures will measure for compression/circiads. off at night.  rx sent to DME f/u in 2-3 week

## 2025-07-01 NOTE — PHYSICAL EXAM
[TextEntry] : alert and oriented in NAD B/L LE warm and well perfused  wound healed no evidence of cellulitis  VDX: b/l saphenous vein closed.  No DVT.

## 2025-07-02 NOTE — PHYSICAL EXAM
[1+] : left 1+ [Ankle Swelling (On Exam)] : present [Ankle Swelling Bilaterally] : bilaterally  [Ankle Swelling On The Right] : mild [Varicose Veins Of Lower Extremities] : bilaterally [Alert] : alert [Oriented to Person] : oriented to person [Oriented to Place] : oriented to place [Oriented to Time] : oriented to time [Calm] : calm [Please See PDF for Tissue Analytics] : Please See PDF for Tissue Analytics. [Skin Ulcer] : no ulcer [de-identified] : NAD [de-identified] : AT [de-identified] : supple [de-identified] : equal chest  rise [de-identified] : FROM

## 2025-07-02 NOTE — HISTORY OF PRESENT ILLNESS
[FreeTextEntry1] : 4-2-25: Mr. LAILA MANCIA   presents to the office with a wound for since 1-7-25 (Mohs) duration.  The wound is located on  the LLE  The patient has complaints of non healing  The patient has been dressing the wound with wet to dry. The patient denies fevers or chills.  The patient has localized pain to the wound upon dressing changes.  The patient has no other complaints or associated symptoms.   Pt recently admitted to [place] for LLE wound and cellulitis-- 3-7-25 to 3-11-25.  Mohs procedure on 1-7-25 by [facility] Pt has a home care Pt lives in assisted living Pt with BUE rash.  Saw PMD today and given steroids.    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 He is awaiting wound care to be set up at [facility]. Continue with compression stockings daily. Home health services for wound care daily. Would recommend treatment of his underlying venous insufficiency. Will plan for VenaSeal of the GSV bilaterally. Continue follow-up with Dr. Marinelli today regarding carotid artery stenosis."  5-30-25 Pt presents for follow up with his daughter. He was trying to put his regular shoe on and pulled his ACE wrap above his ankle causing his foot to swell. His home health nurse comes twice a week.

## 2025-07-02 NOTE — ASSESSMENT
[FreeTextEntry1] : 4-2-25: Pt here in consultation forLLE wound Accompanied by spouse and daughter On exam: LLE:  dry skin:  wound with slough and hypergran. superficial wounds in periowund with dry skin. s/p excisional debridement and monsel's to hypergran.  No s/s of infection.   4-18-25: Pt here for f/u Accompanied by daughter No new complaints Still has to make appt with Dr. Baez Nurse comes 3x/week Pt scratching leg On exam: LLE wound: smaller, slough s/p excisional debridement,  petechiae in periwound withdry flaking skin No s/s of infection.   5-9-25: Pt here for f/u Accompanied by daughter No new complaints Has appt with Dr. Baez On exam: LLE wound:  smaller.  scant  slough . s/p excisional debridement   s/p mechanical debridement No s/s of infection.  dry scaly skin to legs  5-30-25 Pt here for f/u Accompanied by daughter Had ablation on the LLE. Planning on having ablation on the RLE June 18th.  On exam: LLE wound:  smaller., superficial   scant  slough. s/p mechanical debridement No s/s of infection.  dry scaly skin with punctate areas of erythema to legs  6-13-25: Pt here for f/u Accompanied by daughter No new complaints Has appt with vasc on 6-18-25 Hasnt seen derm yet On exam: LLE: dry scaly skin with scattered areas of punctate erythema.  small superficial wound remains s/p mechanical debridement  No s/s of infection.   7-2-25: Pt here for f/u Accompanied by daughter No new complaints F/u with vascular post procedure 7-1-25.  Procedure successful. No DVT No wound. F/u prn f/u'ed with dermatology 2 weeks ago and was dx'ed with MRSA-- treated with doxy and fllucinonide.  Still on doxy.  Saw derm this week and stopped flucinonide and continued ammonium lactate circaids have not arived as per daughter On exam: LLE: wound healed.  skin texture improved.  No s/s of infection.